# Patient Record
Sex: MALE | Race: WHITE | URBAN - NONMETROPOLITAN AREA
[De-identification: names, ages, dates, MRNs, and addresses within clinical notes are randomized per-mention and may not be internally consistent; named-entity substitution may affect disease eponyms.]

---

## 2023-01-25 ENCOUNTER — WEB ENCOUNTER (OUTPATIENT)
Dept: URBAN - NONMETROPOLITAN AREA CLINIC 2 | Facility: CLINIC | Age: 63
End: 2023-01-25

## 2023-01-31 ENCOUNTER — CLAIMS CREATED FROM THE CLAIM WINDOW (OUTPATIENT)
Dept: URBAN - NONMETROPOLITAN AREA CLINIC 2 | Facility: CLINIC | Age: 63
End: 2023-01-31
Payer: MEDICARE

## 2023-01-31 ENCOUNTER — LAB OUTSIDE AN ENCOUNTER (OUTPATIENT)
Dept: URBAN - NONMETROPOLITAN AREA CLINIC 2 | Facility: CLINIC | Age: 63
End: 2023-01-31

## 2023-01-31 VITALS
HEART RATE: 66 BPM | BODY MASS INDEX: 23.74 KG/M2 | TEMPERATURE: 97.6 F | WEIGHT: 185 LBS | HEIGHT: 74 IN | SYSTOLIC BLOOD PRESSURE: 154 MMHG | DIASTOLIC BLOOD PRESSURE: 87 MMHG

## 2023-01-31 DIAGNOSIS — Z12.11 COLON CANCER SCREENING: ICD-10-CM

## 2023-01-31 DIAGNOSIS — K44.9 HIATAL HERNIA: ICD-10-CM

## 2023-01-31 DIAGNOSIS — K22.70 BARRETT'S ESOPHAGUS WITHOUT DYSPLASIA: ICD-10-CM

## 2023-01-31 PROCEDURE — 99214 OFFICE O/P EST MOD 30 MIN: CPT | Performed by: NURSE PRACTITIONER

## 2023-01-31 RX ORDER — FAMOTIDINE 20 MG/1
1 TABLET AT BEDTIME AS NEEDED TABLET, FILM COATED ORAL ONCE A DAY
Status: ACTIVE | COMMUNITY

## 2023-01-31 RX ORDER — CLONAZEPAM 0.5 MG/1
ONCE PER WEEK TABLET ORAL
Qty: 0 | Refills: 0 | Status: ON HOLD | COMMUNITY
Start: 1900-01-01

## 2023-01-31 RX ORDER — FAMOTIDINE 20 MG/1
TABLET ORAL
Qty: 0 | Refills: 0 | Status: ON HOLD | COMMUNITY
Start: 1900-01-01

## 2023-01-31 RX ORDER — CLONAZEPAM 0.25 MG/1
1 TABLET ON THE TONGUE AND ALLOW TO DISSOLVE 30 MINUTES BEFORE BEDTIME TABLET, ORALLY DISINTEGRATING ORAL ONCE A DAY
Status: ACTIVE | COMMUNITY

## 2023-01-31 NOTE — HPI-TODAY'S VISIT:
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  His last endoscopy was done by Dr. 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  He is on famotidine 20 mg daily.  He does have occasional breakthrough symptoms once or twice a week.  He is not taking PPI but has tolerated these in the past.  He does take Tums occasionally if he has breakthrough reflux.  This is typically after eating chocolate late at night.  Today he is due for surveillance endoscopy.  We have discussed adding famotidine twice daily on a regular basis to avoid breakthrough symptoms.  We will consider ppi follow-up pending his EGD results.  MB

## 2023-04-26 ENCOUNTER — OFFICE VISIT (OUTPATIENT)
Dept: URBAN - NONMETROPOLITAN AREA SURGERY CENTER 1 | Facility: SURGERY CENTER | Age: 63
End: 2023-04-26
Payer: MEDICARE

## 2023-04-26 ENCOUNTER — CLAIMS CREATED FROM THE CLAIM WINDOW (OUTPATIENT)
Dept: URBAN - METROPOLITAN AREA CLINIC 4 | Facility: CLINIC | Age: 63
End: 2023-04-26
Payer: MEDICARE

## 2023-04-26 DIAGNOSIS — K22.70 BARRETT'S ESOPHAGUS WITHOUT DYSPLASIA: ICD-10-CM

## 2023-04-26 DIAGNOSIS — K29.70 GASTRITIS, UNSPECIFIED, WITHOUT BLEEDING: ICD-10-CM

## 2023-04-26 DIAGNOSIS — K29.60 OTHER GASTRITIS WITHOUT BLEEDING: ICD-10-CM

## 2023-04-26 DIAGNOSIS — K22.719 BARRETT'S ESOPHAGUS WITH DYSPLASIA, UNSPECIFIED: ICD-10-CM

## 2023-04-26 DIAGNOSIS — R10.13 ABDOMINAL PAIN, EPIGASTRIC: ICD-10-CM

## 2023-04-26 DIAGNOSIS — K22.89 OTHER SPECIFIED DISEASE OF ESOPHAGUS: ICD-10-CM

## 2023-04-26 DIAGNOSIS — K31.89 OTHER DISEASES OF STOMACH AND DUODENUM: ICD-10-CM

## 2023-04-26 PROCEDURE — G8907 PT DOC NO EVENTS ON DISCHARG: HCPCS | Performed by: INTERNAL MEDICINE

## 2023-04-26 PROCEDURE — 43239 EGD BIOPSY SINGLE/MULTIPLE: CPT | Performed by: INTERNAL MEDICINE

## 2023-04-26 PROCEDURE — 88305 TISSUE EXAM BY PATHOLOGIST: CPT | Performed by: PATHOLOGY

## 2023-04-26 PROCEDURE — 88312 SPECIAL STAINS GROUP 1: CPT | Performed by: PATHOLOGY

## 2023-04-26 PROCEDURE — 88313 SPECIAL STAINS GROUP 2: CPT | Performed by: PATHOLOGY

## 2023-05-17 ENCOUNTER — WEB ENCOUNTER (OUTPATIENT)
Dept: URBAN - NONMETROPOLITAN AREA CLINIC 2 | Facility: CLINIC | Age: 63
End: 2023-05-17

## 2023-06-01 ENCOUNTER — OFFICE VISIT (OUTPATIENT)
Dept: URBAN - NONMETROPOLITAN AREA CLINIC 2 | Facility: CLINIC | Age: 63
End: 2023-06-01
Payer: MEDICARE

## 2023-06-01 ENCOUNTER — DASHBOARD ENCOUNTERS (OUTPATIENT)
Age: 63
End: 2023-06-01

## 2023-06-01 VITALS
BODY MASS INDEX: 24.38 KG/M2 | SYSTOLIC BLOOD PRESSURE: 172 MMHG | HEIGHT: 74 IN | HEART RATE: 62 BPM | WEIGHT: 190 LBS | DIASTOLIC BLOOD PRESSURE: 90 MMHG

## 2023-06-01 DIAGNOSIS — Z12.11 COLON CANCER SCREENING: ICD-10-CM

## 2023-06-01 DIAGNOSIS — K22.70 BARRETT'S ESOPHAGUS WITHOUT DYSPLASIA: ICD-10-CM

## 2023-06-01 DIAGNOSIS — K44.9 HIATAL HERNIA: ICD-10-CM

## 2023-06-01 PROBLEM — 84089009: Status: ACTIVE | Noted: 2023-01-31

## 2023-06-01 PROBLEM — 305058001: Status: ACTIVE | Noted: 2023-01-31

## 2023-06-01 PROCEDURE — 99214 OFFICE O/P EST MOD 30 MIN: CPT

## 2023-06-01 RX ORDER — CLONAZEPAM 0.25 MG/1
1 TABLET ON THE TONGUE AND ALLOW TO DISSOLVE 30 MINUTES BEFORE BEDTIME TABLET, ORALLY DISINTEGRATING ORAL ONCE A DAY
Status: DISCONTINUED | COMMUNITY

## 2023-06-01 RX ORDER — ESCITALOPRAM OXALATE 10 MG/1
1 TABLET TABLET, FILM COATED ORAL ONCE A DAY
Status: ACTIVE | COMMUNITY

## 2023-06-01 RX ORDER — CLONAZEPAM 0.5 MG/1
ONCE PER WEEK TABLET ORAL
Qty: 0 | Refills: 0 | Status: DISCONTINUED | COMMUNITY
Start: 1900-01-01

## 2023-06-01 RX ORDER — FAMOTIDINE 20 MG/1
TABLET ORAL
Qty: 0 | Refills: 0 | Status: DISCONTINUED | COMMUNITY
Start: 1900-01-01

## 2023-06-01 RX ORDER — FAMOTIDINE 20 MG/1
1 TABLET AT BEDTIME AS NEEDED TABLET, FILM COATED ORAL ONCE A DAY
Status: ACTIVE | COMMUNITY

## 2023-06-01 RX ORDER — CARVEDILOL 6.25 MG/1
1 TABLET WITH FOOD TABLET, FILM COATED ORAL TWICE A DAY
Status: ACTIVE | COMMUNITY

## 2023-06-01 NOTE — HPI-TODAY'S VISIT:
1/31/2023 Bryon presents for evaluation of short segment Clark's esophagus.  His last endoscopy was done by Dr. 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  He is on famotidine 20 mg daily.  He does have occasional breakthrough symptoms once or twice a week.  He is not taking PPI but has tolerated these in the past.  He does take Tums occasionally if he has breakthrough reflux.  This is typically after eating chocolate late at night.  Today he is due for surveillance endoscopy.  We have discussed adding famotidine twice daily on a regular basis to avoid breakthrough symptoms.  We will consider ppi follow-up pending his EGD results.  MB  6/1/23 Byron returns after surveillance EGD for Clark's showing short segment, metaplasia on path.  HE has some coughing waking up at night drowning feeling and questions reflux involvement. He does snore and has new highblood pressure. He would like to stay on famotidine 30 min before supper and tums for breakthrough, retiscent to move to PPI at this time.  Does well with diet management and not eating 3 hrs before bedtime. He also does better if sleeps on left side. He leaves for his time at his [place] home this month, returns in November.  SP